# Patient Record
(demographics unavailable — no encounter records)

---

## 2025-07-15 NOTE — PHYSICAL EXAM
[Alert] : alert [Normal External Genitalia] : normal external genitalia [Patent] : patent [NL] : warm, clear [FreeTextEntry5] : RED REFLEX NML B/L; small subconjunctival hematoma [de-identified] : negative ortolani and abrams

## 2025-07-15 NOTE — HISTORY OF PRESENT ILLNESS
[de-identified] :  visit via c/s; 39 weeks birth weight 8lbs 5 oz; passed hearing screening bilaterally [FreeTextEntry6] : bilateral hands and feet blue-anthony color redness in (r) eye

## 2025-07-15 NOTE — DISCUSSION/SUMMARY
[FreeTextEntry1] : Recommend exclusive breastfeeding, 8-12 feedings per day, supplement with formula as needed. Mother should continue prenatal vitamins and avoid alcohol. If formula is needed, recommend iron-fortified formulations every 2-3 hrs. When in car, patient should be in rear-facing car seat in back seat. Air dry umbilical stump. Put baby to sleep on back, in own crib with no loose or soft bedding. Limit baby's exposure to others, especially those with fever or unknown vaccine status.  Will follow in one week for weight check.

## 2025-07-23 NOTE — HISTORY OF PRESENT ILLNESS
[de-identified] : no concerns- follow up [FreeTextEntry6] : afebrile norm susan/bm umbilical cord fell off

## 2025-07-23 NOTE — DISCUSSION/SUMMARY
[FreeTextEntry1] : Umbillical granuloma cauterized today in office. Silver nitrate will cause temporary darkening of umbillicus. Procedure well tolerated. If umbilicus continues to bleed or have discharge, contact office.  Will monitor labia minora.  Mother instructed if arya become more erythematous or swollen to RTC.

## 2025-07-23 NOTE — PHYSICAL EXAM
[Erythematous Labia Minora] : erythematous labia minora [NL] : warm, clear [FreeTextEntry9] : Granuloma to umblicial